# Patient Record
Sex: MALE | Race: WHITE | NOT HISPANIC OR LATINO | URBAN - NONMETROPOLITAN AREA
[De-identification: names, ages, dates, MRNs, and addresses within clinical notes are randomized per-mention and may not be internally consistent; named-entity substitution may affect disease eponyms.]

---

## 2020-06-29 ENCOUNTER — OFFICE VISIT (OUTPATIENT)
Dept: URBAN - NONMETROPOLITAN AREA CLINIC 2 | Facility: CLINIC | Age: 55
End: 2020-06-29
Payer: COMMERCIAL

## 2020-06-29 DIAGNOSIS — Z12.11 ROUTINE COLON: ICD-10-CM

## 2020-06-29 DIAGNOSIS — K21.9 GASTROESOPHAGEAL REFLUX DISEASE WITHOUT ESOPHAGITIS: ICD-10-CM

## 2020-06-29 PROCEDURE — G8420 CALC BMI NORM PARAMETERS: HCPCS | Performed by: NURSE PRACTITIONER

## 2020-06-29 PROCEDURE — 3017F COLORECTAL CA SCREEN DOC REV: CPT | Performed by: NURSE PRACTITIONER

## 2020-06-29 PROCEDURE — 99213 OFFICE O/P EST LOW 20 MIN: CPT | Performed by: NURSE PRACTITIONER

## 2020-06-29 PROCEDURE — G8427 DOCREV CUR MEDS BY ELIG CLIN: HCPCS | Performed by: NURSE PRACTITIONER

## 2020-06-29 RX ORDER — OMEPRAZOLE 40 MG/1
1 CAPSULE 30 MINUTES BEFORE EVENING MEAL CAPSULE, DELAYED RELEASE ORAL ONCE A DAY
Qty: 90 | Refills: 3 | OUTPATIENT
Start: 2020-06-29

## 2020-06-29 NOTE — HPI-TODAY'S VISIT:
Mr. Clay Zhang is here for acid reflux. He was last seen in August for positive cologuard. His colonoscopy was normal. Over the last few months, he has been having frequent indigestion and acid reflux. He has not noticed a particular food that causes or makes it worse. He has no problems if he does not eat. His symptoms are worse at night and make it hard to sleep. His symptoms resolve with TUMs. He denies any symptoms like this before. He denies any alcohol use. He takes ibuprofen rarely. He drinks caffeine daily. He denies any weight or appetite changes. He denies any blood in his stool or melena. He admits to eating late and laying down. CS

## 2020-06-29 NOTE — HPI-OTHER HISTORIES
8/7/2019 Clay Zhang is a 54 year old male who presents today for a SCREENING COLONOSCOPY. He did have a positive cologuard test 7/2019. He denies any problems with His bowels, no diarrhea and no constipation. He denies any blood in the stool. He denies any history of anemia. He last had colonoscopy by Dr. Neymar Plunkett in 2014 that was normal. He denies family history of colon cancer or colon polyps. He is a fairly healthy person and has otherwise been feeling fairly well. TG   9/9/2019 Colonoscopy: Normal. Due 2029

## 2020-09-30 ENCOUNTER — OFFICE VISIT (OUTPATIENT)
Dept: URBAN - NONMETROPOLITAN AREA CLINIC 2 | Facility: CLINIC | Age: 55
End: 2020-09-30
Payer: COMMERCIAL

## 2020-09-30 DIAGNOSIS — Z12.11 ROUTINE COLON: ICD-10-CM

## 2020-09-30 DIAGNOSIS — K21.9 GASTROESOPHAGEAL REFLUX DISEASE WITHOUT ESOPHAGITIS: ICD-10-CM

## 2020-09-30 PROCEDURE — G8427 DOCREV CUR MEDS BY ELIG CLIN: HCPCS | Performed by: NURSE PRACTITIONER

## 2020-09-30 PROCEDURE — G8420 CALC BMI NORM PARAMETERS: HCPCS | Performed by: NURSE PRACTITIONER

## 2020-09-30 PROCEDURE — 3017F COLORECTAL CA SCREEN DOC REV: CPT | Performed by: NURSE PRACTITIONER

## 2020-09-30 PROCEDURE — 99213 OFFICE O/P EST LOW 20 MIN: CPT | Performed by: NURSE PRACTITIONER

## 2020-09-30 RX ORDER — OMEPRAZOLE 40 MG/1
1 CAPSULE 30 MINUTES BEFORE EVENING MEAL CAPSULE, DELAYED RELEASE ORAL ONCE A DAY
Qty: 90 | Refills: 3 | OUTPATIENT

## 2020-09-30 RX ORDER — OMEPRAZOLE 40 MG/1
1 CAPSULE 30 MINUTES BEFORE EVENING MEAL CAPSULE, DELAYED RELEASE ORAL ONCE A DAY
Qty: 90 | Refills: 3 | Status: ACTIVE | COMMUNITY
Start: 2020-06-29

## 2020-09-30 NOTE — HPI-OTHER HISTORIES
8/7/2019 Clay Zhang is a 54 year old male who presents today for a SCREENING COLONOSCOPY. He did have a positive cologuard test 7/2019. He denies any problems with His bowels, no diarrhea and no constipation. He denies any blood in the stool. He denies any history of anemia. He last had colonoscopy by Dr. Neymar Plunkett in 2014 that was normal. He denies family history of colon cancer or colon polyps. He is a fairly healthy person and has otherwise been feeling fairly well. TG   9/9/2019 Colonoscopy: Normal. Due 2029 6/29/2020 Mr. Clay Zhang is here for acid reflux. He was last seen in August for positive cologuard. His colonoscopy was normal. Over the last few months, he has been having frequent indigestion and acid reflux. He has not noticed a particular food that causes or makes it worse. He has no problems if he does not eat. His symptoms are worse at night and make it hard to sleep. His symptoms resolve with TUMs. He denies any symptoms like this before. He denies any alcohol use. He takes ibuprofen rarely. He drinks caffeine daily. He denies any weight or appetite changes. He denies any blood in his stool or melena. He admits to eating late and laying down. CS

## 2020-09-30 NOTE — HPI-TODAY'S VISIT:
Mr. Clay Zhang is here for f/u of GERD. At his last OV, he was having night time reflux. He was started on oemprazole and advised to try and avoid eating late and laying down. Today, he reports his symptoms are well controlled. He had trouble for the first two months remembering to take the medication. Over the last month, he has been very strict and it is working well. He denies any return of symptoms. CS

## 2021-01-04 ENCOUNTER — OFFICE VISIT (OUTPATIENT)
Dept: URBAN - NONMETROPOLITAN AREA CLINIC 2 | Facility: CLINIC | Age: 56
End: 2021-01-04
Payer: COMMERCIAL

## 2021-01-04 ENCOUNTER — LAB OUTSIDE AN ENCOUNTER (OUTPATIENT)
Dept: URBAN - NONMETROPOLITAN AREA CLINIC 2 | Facility: CLINIC | Age: 56
End: 2021-01-04

## 2021-01-04 DIAGNOSIS — K21.9 GASTROESOPHAGEAL REFLUX DISEASE WITHOUT ESOPHAGITIS: ICD-10-CM

## 2021-01-04 DIAGNOSIS — Z12.11 ROUTINE COLON: ICD-10-CM

## 2021-01-04 PROCEDURE — 99213 OFFICE O/P EST LOW 20 MIN: CPT | Performed by: NURSE PRACTITIONER

## 2021-01-04 PROCEDURE — G8427 DOCREV CUR MEDS BY ELIG CLIN: HCPCS | Performed by: NURSE PRACTITIONER

## 2021-01-04 PROCEDURE — 3017F COLORECTAL CA SCREEN DOC REV: CPT | Performed by: NURSE PRACTITIONER

## 2021-01-04 PROCEDURE — G8420 CALC BMI NORM PARAMETERS: HCPCS | Performed by: NURSE PRACTITIONER

## 2021-01-04 RX ORDER — OMEPRAZOLE 40 MG/1
1 CAPSULE 30 MINUTES BEFORE EVENING MEAL CAPSULE, DELAYED RELEASE ORAL ONCE A DAY
Qty: 90 | Refills: 3 | OUTPATIENT

## 2021-01-04 RX ORDER — FAMOTIDINE 20 MG/1
1 TABLET AT BEDTIME AS NEEDED TABLET, FILM COATED ORAL ONCE A DAY
Qty: 90 TABLET | Refills: 11 | OUTPATIENT
Start: 2021-01-04

## 2021-01-04 RX ORDER — OMEPRAZOLE 40 MG/1
1 CAPSULE 30 MINUTES BEFORE EVENING MEAL CAPSULE, DELAYED RELEASE ORAL ONCE A DAY
Qty: 90 | Refills: 3 | Status: ACTIVE | COMMUNITY

## 2021-01-04 NOTE — HPI-OTHER HISTORIES
8/7/2019 Clay Zhang is a 54 year old male who presents today for a SCREENING COLONOSCOPY. He did have a positive cologuard test 7/2019. He denies any problems with His bowels, no diarrhea and no constipation. He denies any blood in the stool. He denies any history of anemia. He last had colonoscopy by Dr. Neymar Plunkett in 2014 that was normal. He denies family history of colon cancer or colon polyps. He is a fairly healthy person and has otherwise been feeling fairly well. TG   9/9/2019 Colonoscopy: Normal. Due 2029 6/29/2020 Mr. Clay Zhang is here for acid reflux. He was last seen in August for positive cologuard. His colonoscopy was normal. Over the last few months, he has been having frequent indigestion and acid reflux. He has not noticed a particular food that causes or makes it worse. He has no problems if he does not eat. His symptoms are worse at night and make it hard to sleep. His symptoms resolve with TUMs. He denies any symptoms like this before. He denies any alcohol use. He takes ibuprofen rarely. He drinks caffeine daily. He denies any weight or appetite changes. He denies any blood in his stool or melena. He admits to eating late and laying down. CS   9/30/2020 Mr. Clay Zhang is here for f/u of GERD. At his last OV, he was having night time reflux. He was started on oemprazole and advised to try and avoid eating late and laying down. Today, he reports his symptoms are well controlled. He had trouble for the first two months remembering to take the medication. Over the last month, he has been very strict and it is working well. He denies any return of symptoms. CS

## 2021-01-04 NOTE — HPI-TODAY'S VISIT:
1/4/2021 Mr. Clay Zhang is here for f/u of GERD. In June, he was having night time reflux. He was started on omeprazole and advised to try and avoid eating late and laying down. This helped. He weaned off and his symptoms have returned several days a week. It is often associated with eating late. He will take TUMS with improvement. He denies any blood in his stool or melena. HIs weight and appetite are stable. CS

## 2021-01-07 ENCOUNTER — CLAIMS CREATED FROM THE CLAIM WINDOW (OUTPATIENT)
Dept: URBAN - METROPOLITAN AREA CLINIC 4 | Facility: CLINIC | Age: 56
End: 2021-01-07
Payer: COMMERCIAL

## 2021-01-07 ENCOUNTER — OFFICE VISIT (OUTPATIENT)
Dept: URBAN - NONMETROPOLITAN AREA SURGERY CENTER 1 | Facility: SURGERY CENTER | Age: 56
End: 2021-01-07
Payer: COMMERCIAL

## 2021-01-07 DIAGNOSIS — K21.00 GASTRO-ESOPHAGEAL REFLUX DISEASE WITH ESOPHAGITIS, WITHOUT BLEEDING: ICD-10-CM

## 2021-01-07 DIAGNOSIS — K21.9 ACID REFLUX: ICD-10-CM

## 2021-01-07 PROCEDURE — 88312 SPECIAL STAINS GROUP 1: CPT | Performed by: PATHOLOGY

## 2021-01-07 PROCEDURE — G8907 PT DOC NO EVENTS ON DISCHARG: HCPCS | Performed by: INTERNAL MEDICINE

## 2021-01-07 PROCEDURE — 88305 TISSUE EXAM BY PATHOLOGIST: CPT | Performed by: PATHOLOGY

## 2021-01-07 PROCEDURE — 43239 EGD BIOPSY SINGLE/MULTIPLE: CPT | Performed by: INTERNAL MEDICINE

## 2021-01-28 ENCOUNTER — OFFICE VISIT (OUTPATIENT)
Dept: URBAN - NONMETROPOLITAN AREA CLINIC 2 | Facility: CLINIC | Age: 56
End: 2021-01-28
Payer: COMMERCIAL

## 2021-01-28 DIAGNOSIS — K21.9 GASTROESOPHAGEAL REFLUX DISEASE WITHOUT ESOPHAGITIS: ICD-10-CM

## 2021-01-28 DIAGNOSIS — Z12.11 ROUTINE COLON: ICD-10-CM

## 2021-01-28 PROCEDURE — 99213 OFFICE O/P EST LOW 20 MIN: CPT | Performed by: NURSE PRACTITIONER

## 2021-01-28 PROCEDURE — 3017F COLORECTAL CA SCREEN DOC REV: CPT | Performed by: NURSE PRACTITIONER

## 2021-01-28 PROCEDURE — G8420 CALC BMI NORM PARAMETERS: HCPCS | Performed by: NURSE PRACTITIONER

## 2021-01-28 PROCEDURE — G8427 DOCREV CUR MEDS BY ELIG CLIN: HCPCS | Performed by: NURSE PRACTITIONER

## 2021-01-28 RX ORDER — OMEPRAZOLE 40 MG/1
1 CAPSULE 30 MINUTES BEFORE EVENING MEAL CAPSULE, DELAYED RELEASE ORAL ONCE A DAY
Qty: 90 | Refills: 3 | Status: ACTIVE | COMMUNITY

## 2021-01-28 RX ORDER — FAMOTIDINE 20 MG/1
1 TABLET AT BEDTIME AS NEEDED TABLET, FILM COATED ORAL ONCE A DAY
Qty: 90 TABLET | Refills: 11 | Status: ACTIVE | COMMUNITY
Start: 2021-01-04

## 2021-01-28 RX ORDER — FAMOTIDINE 20 MG/1
1 TABLET AT BEDTIME AS NEEDED TABLET, FILM COATED ORAL ONCE A DAY
Qty: 90 TABLET | Refills: 11 | OUTPATIENT

## 2021-01-28 RX ORDER — OMEPRAZOLE 20 MG/1
1 CAPSULE 30 MINUTES BEFORE MORNING MEAL CAPSULE, DELAYED RELEASE ORAL ONCE A DAY
Qty: 90 | Refills: 3 | OUTPATIENT

## 2021-01-28 NOTE — HPI-OTHER HISTORIES
8/7/2019 Clay Zhang is a 54 year old male who presents today for a SCREENING COLONOSCOPY. He did have a positive cologuard test 7/2019. He denies any problems with His bowels, no diarrhea and no constipation. He denies any blood in the stool. He denies any history of anemia. He last had colonoscopy by Dr. Neymar Plunkett in 2014 that was normal. He denies family history of colon cancer or colon polyps. He is a fairly healthy person and has otherwise been feeling fairly well. TG   9/9/2019 Colonoscopy: Normal. Due 2029 6/29/2020 Mr. Clay Zhang is here for acid reflux. He was last seen in August for positive cologuard. His colonoscopy was normal. Over the last few months, he has been having frequent indigestion and acid reflux. He has not noticed a particular food that causes or makes it worse. He has no problems if he does not eat. His symptoms are worse at night and make it hard to sleep. His symptoms resolve with TUMs. He denies any symptoms like this before. He denies any alcohol use. He takes ibuprofen rarely. He drinks caffeine daily. He denies any weight or appetite changes. He denies any blood in his stool or melena. He admits to eating late and laying down. CS   9/30/2020 Mr. Clay Zhang is here for f/u of GERD. At his last OV, he was having night time reflux. He was started on oemprazole and advised to try and avoid eating late and laying down. Today, he reports his symptoms are well controlled. He had trouble for the first two months remembering to take the medication. Over the last month, he has been very strict and it is working well. He denies any return of symptoms. CS   1/4/2021 Mr. Clay Zhang is here for f/u of GERD. In June, he was having night time reflux. He was started on omeprazole and advised to try and avoid eating late and laying down. This helped. He weaned off and his symptoms have returned several days a week. It is often associated with eating late. He will take TUMS with improvement. He denies any blood in his stool or melena. HIs weight and appetite are stable. CS

## 2021-01-28 NOTE — HPI-TODAY'S VISIT:
1/28/2021 Mr. Clay Zhang is here for f/u of GERD. In June, he was having night time reflux. Improved with omeprazole but when he stopped his symptoms returned several days a week. He did not want to restart anything daily. He was started on pepcid 40mg at bedtime prn and scheduled for an EGD. This showed Grade A esophagitis and small hiatal hernia. Path reflux esophagitis. Today, he continues to have some breakthrough. He has been trying not to eat late. CS

## 2021-06-02 ENCOUNTER — OFFICE VISIT (OUTPATIENT)
Dept: URBAN - NONMETROPOLITAN AREA CLINIC 2 | Facility: CLINIC | Age: 56
End: 2021-06-02

## 2021-06-30 ENCOUNTER — WEB ENCOUNTER (OUTPATIENT)
Dept: URBAN - NONMETROPOLITAN AREA CLINIC 2 | Facility: CLINIC | Age: 56
End: 2021-06-30

## 2021-06-30 ENCOUNTER — OFFICE VISIT (OUTPATIENT)
Dept: URBAN - NONMETROPOLITAN AREA CLINIC 2 | Facility: CLINIC | Age: 56
End: 2021-06-30
Payer: COMMERCIAL

## 2021-06-30 VITALS
HEIGHT: 71 IN | TEMPERATURE: 97.8 F | DIASTOLIC BLOOD PRESSURE: 69 MMHG | HEART RATE: 63 BPM | SYSTOLIC BLOOD PRESSURE: 121 MMHG | BODY MASS INDEX: 23.8 KG/M2 | WEIGHT: 170 LBS

## 2021-06-30 DIAGNOSIS — K21.9 GASTROESOPHAGEAL REFLUX DISEASE WITHOUT ESOPHAGITIS: ICD-10-CM

## 2021-06-30 DIAGNOSIS — Z12.11 ROUTINE COLON: ICD-10-CM

## 2021-06-30 PROCEDURE — 99213 OFFICE O/P EST LOW 20 MIN: CPT | Performed by: INTERNAL MEDICINE

## 2021-06-30 RX ORDER — OMEPRAZOLE 20 MG/1
1 CAPSULE 30 MINUTES BEFORE MORNING MEAL CAPSULE, DELAYED RELEASE ORAL ONCE A DAY
Qty: 90 | Refills: 3 | Status: ACTIVE | COMMUNITY

## 2021-06-30 RX ORDER — FAMOTIDINE 20 MG/1
1 TABLET AT BEDTIME AS NEEDED TABLET, FILM COATED ORAL ONCE A DAY
Qty: 90 TABLET | Refills: 11 | Status: ACTIVE | COMMUNITY

## 2021-06-30 RX ORDER — OMEPRAZOLE 20 MG/1
1 CAPSULE 30 MINUTES BEFORE MORNING MEAL CAPSULE, DELAYED RELEASE ORAL ONCE A DAY
Qty: 90 | Refills: 3 | OUTPATIENT

## 2021-06-30 RX ORDER — FAMOTIDINE 20 MG/1
1 TABLET AT BEDTIME AS NEEDED TABLET, FILM COATED ORAL ONCE A DAY
Qty: 90 TABLET | Refills: 11 | OUTPATIENT

## 2021-06-30 NOTE — HPI-OTHER HISTORIES
8/7/2019 Clay Zhang is a 54 year old male who presents today for a SCREENING COLONOSCOPY. He did have a positive cologuard test 7/2019. He denies any problems with His bowels, no diarrhea and no constipation. He denies any blood in the stool. He denies any history of anemia. He last had colonoscopy by Dr. Neymar Plunkett in 2014 that was normal. He denies family history of colon cancer or colon polyps. He is a fairly healthy person and has otherwise been feeling fairly well. TG   9/9/2019 Colonoscopy: Normal. Due 2029 6/29/2020 Mr. Clay Zhang is here for acid reflux. He was last seen in August for positive cologuard. His colonoscopy was normal. Over the last few months, he has been having frequent indigestion and acid reflux. He has not noticed a particular food that causes or makes it worse. He has no problems if he does not eat. His symptoms are worse at night and make it hard to sleep. His symptoms resolve with TUMs. He denies any symptoms like this before. He denies any alcohol use. He takes ibuprofen rarely. He drinks caffeine daily. He denies any weight or appetite changes. He denies any blood in his stool or melena. He admits to eating late and laying down. CS   9/30/2020 Mr. Clay Zhang is here for f/u of GERD. At his last OV, he was having night time reflux. He was started on oemprazole and advised to try and avoid eating late and laying down. Today, he reports his symptoms are well controlled. He had trouble for the first two months remembering to take the medication. Over the last month, he has been very strict and it is working well. He denies any return of symptoms. CS   1/4/2021 Mr. Clay Zhang is here for f/u of GERD. In June, he was having night time reflux. He was started on omeprazole and advised to try and avoid eating late and laying down. This helped. He weaned off and his symptoms have returned several days a week. It is often associated with eating late. He will take TUMS with improvement. He denies any blood in his stool or melena. HIs weight and appetite are stable. CS   1/28/2021 Mr. Clay Zhang is here for f/u of GERD. In June, he was having night time reflux. Improved with omeprazole but when he stopped his symptoms returned several days a week. He did not want to restart anything daily. He was started on pepcid 40mg at bedtime prn and scheduled for an EGD. This showed Grade A esophagitis and small hiatal hernia. Path reflux esophagitis. Today, he continues to have some breakthrough. He has been trying not to eat late. CS

## 2021-06-30 NOTE — HPI-TODAY'S VISIT:
6/30/2021 Mr. Clay Zhang is here for f/u of GERD. In June, he was having night time reflux. Improved with omeprazole but when he stopped his symptoms returned several days a week. He did not want to restart anything daily. He was started on pepcid 40mg at bedtime prn and scheduled for an EGD. This showed Grade A esophagitis and small hiatal hernia. Path reflux esophagitis. In January, we restarted omeprazole 20mg daily for three months then switched to pepcid 40mg in the evening. He has been doing well since this time. He only has breakthrough if he eats too much too late. Overall, he is feeling well. CS

## 2021-11-19 ENCOUNTER — OFFICE VISIT (OUTPATIENT)
Dept: URBAN - NONMETROPOLITAN AREA CLINIC 2 | Facility: CLINIC | Age: 56
End: 2021-11-19

## 2021-12-07 ENCOUNTER — LAB OUTSIDE AN ENCOUNTER (OUTPATIENT)
Dept: URBAN - NONMETROPOLITAN AREA CLINIC 13 | Facility: CLINIC | Age: 56
End: 2021-12-07

## 2021-12-07 ENCOUNTER — OFFICE VISIT (OUTPATIENT)
Dept: URBAN - NONMETROPOLITAN AREA CLINIC 13 | Facility: CLINIC | Age: 56
End: 2021-12-07
Payer: COMMERCIAL

## 2021-12-07 ENCOUNTER — DASHBOARD ENCOUNTERS (OUTPATIENT)
Age: 56
End: 2021-12-07

## 2021-12-07 DIAGNOSIS — R10.10 PAIN OF UPPER ABDOMEN: ICD-10-CM

## 2021-12-07 DIAGNOSIS — K21.9 GASTROESOPHAGEAL REFLUX DISEASE WITHOUT ESOPHAGITIS: ICD-10-CM

## 2021-12-07 DIAGNOSIS — Z12.11 ROUTINE COLON: ICD-10-CM

## 2021-12-07 PROBLEM — 266435005 GASTROESOPHAGEAL REFLUX DISEASE WITHOUT ESOPHAGITIS: Status: ACTIVE | Noted: 2021-06-30

## 2021-12-07 PROCEDURE — 99214 OFFICE O/P EST MOD 30 MIN: CPT | Performed by: NURSE PRACTITIONER

## 2021-12-07 RX ORDER — OMEPRAZOLE 20 MG/1
1 CAPSULE 30 MINUTES BEFORE MORNING MEAL CAPSULE, DELAYED RELEASE ORAL ONCE A DAY
Qty: 90 | Refills: 3 | Status: ACTIVE | COMMUNITY

## 2021-12-07 RX ORDER — FAMOTIDINE 20 MG/1
1 TABLET AT BEDTIME AS NEEDED TABLET, FILM COATED ORAL ONCE A DAY
Qty: 90 TABLET | Refills: 11 | Status: ACTIVE | COMMUNITY

## 2021-12-07 RX ORDER — OMEPRAZOLE 20 MG/1
1 CAPSULE 30 MINUTES BEFORE MORNING MEAL CAPSULE, DELAYED RELEASE ORAL ONCE A DAY
Qty: 90 | Refills: 3 | OUTPATIENT

## 2021-12-07 RX ORDER — TADALAFIL 5 MG/1
1 TABLET AS NEEDED TABLET, FILM COATED ORAL ONCE A DAY
Status: ACTIVE | COMMUNITY

## 2021-12-07 RX ORDER — FAMOTIDINE 20 MG/1
1 TABLET 30MINS PRIOR TO MEALS TABLET, FILM COATED ORAL BID
Qty: 180 TABLET | Refills: 3 | OUTPATIENT

## 2021-12-07 NOTE — HPI-TODAY'S VISIT:
12/7/2021 Mr. Clay Zhang is here for f/u of GERD. In June 2020, he was having night time reflux. Improved with omeprazole but when he stopped his symptoms returned several days a week. He did not want to restart anything daily. He was started on pepcid 40mg at bedtime prn and scheduled for an EGD. This showed Grade A esophagitis and small hiatal hernia. Path reflux esophagitis. In January, we restarted omeprazole 20mg daily for three months then switched to pepcid 40mg in the evening. He has been doing very well in the evenings now without any breakthrough. He is now having return of reflux and burning in his stomach in the middle of the day. He recalls one time was when he was bending over picking up sticks and resolved with TUMS. He has not seen triggers other times. He is concerned about this and wonders if he needs a repeat EGD. CS

## 2021-12-07 NOTE — HPI-OTHER HISTORIES
8/7/2019 Clay Zhang is a 54 year old male who presents today for a SCREENING COLONOSCOPY. He did have a positive cologuard test 7/2019. He denies any problems with His bowels, no diarrhea and no constipation. He denies any blood in the stool. He denies any history of anemia. He last had colonoscopy by Dr. Neymar Plunkett in 2014 that was normal. He denies family history of colon cancer or colon polyps. He is a fairly healthy person and has otherwise been feeling fairly well. TG   9/9/2019 Colonoscopy: Normal. Due 2029 6/29/2020 Mr. Clay Zhang is here for acid reflux. He was last seen in August for positive cologuard. His colonoscopy was normal. Over the last few months, he has been having frequent indigestion and acid reflux. He has not noticed a particular food that causes or makes it worse. He has no problems if he does not eat. His symptoms are worse at night and make it hard to sleep. His symptoms resolve with TUMs. He denies any symptoms like this before. He denies any alcohol use. He takes ibuprofen rarely. He drinks caffeine daily. He denies any weight or appetite changes. He denies any blood in his stool or melena. He admits to eating late and laying down. CS   9/30/2020 Mr. Clay Zhang is here for f/u of GERD. At his last OV, he was having night time reflux. He was started on oemprazole and advised to try and avoid eating late and laying down. Today, he reports his symptoms are well controlled. He had trouble for the first two months remembering to take the medication. Over the last month, he has been very strict and it is working well. He denies any return of symptoms. CS   1/4/2021 Mr. Clay Zhang is here for f/u of GERD. In June, he was having night time reflux. He was started on omeprazole and advised to try and avoid eating late and laying down. This helped. He weaned off and his symptoms have returned several days a week. It is often associated with eating late. He will take TUMS with improvement. He denies any blood in his stool or melena. HIs weight and appetite are stable. CS   1/28/2021 Mr. Clay Zhang is here for f/u of GERD. In June, he was having night time reflux. Improved with omeprazole but when he stopped his symptoms returned several days a week. He did not want to restart anything daily. He was started on pepcid 40mg at bedtime prn and scheduled for an EGD. This showed Grade A esophagitis and small hiatal hernia. Path reflux esophagitis. Today, he continues to have some breakthrough. He has been trying not to eat late. CS   6/30/2021 Mr. Clay Zhang is here for f/u of GERD. In June, he was having night time reflux. Improved with omeprazole but when he stopped his symptoms returned several days a week. He did not want to restart anything daily. He was started on pepcid 40mg at bedtime prn and scheduled for an EGD. This showed Grade A esophagitis and small hiatal hernia. Path reflux esophagitis. In January, we restarted omeprazole 20mg daily for three months then switched to pepcid 40mg in the evening. He has been doing well since this time. He only has breakthrough if he eats too much too late. Overall, he is feeling well. CS

## 2021-12-23 ENCOUNTER — TELEPHONE ENCOUNTER (OUTPATIENT)
Dept: URBAN - NONMETROPOLITAN AREA CLINIC 2 | Facility: CLINIC | Age: 56
End: 2021-12-23

## 2023-03-02 ENCOUNTER — ERX REFILL RESPONSE (OUTPATIENT)
Dept: URBAN - NONMETROPOLITAN AREA CLINIC 2 | Facility: CLINIC | Age: 58
End: 2023-03-02

## 2023-03-02 RX ORDER — FAMOTIDINE 20 MG/1
TAKE 1 TABLET BY MOUTH TWO TIMES A DAY 30 MINUTES PRIOR TO MEALS TABLET, FILM COATED ORAL
Qty: 180 TABLET | Refills: 0 | OUTPATIENT